# Patient Record
Sex: FEMALE | Race: OTHER | NOT HISPANIC OR LATINO
[De-identification: names, ages, dates, MRNs, and addresses within clinical notes are randomized per-mention and may not be internally consistent; named-entity substitution may affect disease eponyms.]

---

## 2017-07-26 ENCOUNTER — APPOINTMENT (OUTPATIENT)
Dept: GASTROENTEROLOGY | Facility: CLINIC | Age: 47
End: 2017-07-26

## 2017-08-01 ENCOUNTER — APPOINTMENT (OUTPATIENT)
Dept: GASTROENTEROLOGY | Facility: CLINIC | Age: 47
End: 2017-08-01

## 2017-08-03 ENCOUNTER — APPOINTMENT (OUTPATIENT)
Dept: GASTROENTEROLOGY | Facility: CLINIC | Age: 47
End: 2017-08-03
Payer: COMMERCIAL

## 2017-08-03 VITALS
TEMPERATURE: 98.2 F | DIASTOLIC BLOOD PRESSURE: 80 MMHG | BODY MASS INDEX: 24.19 KG/M2 | SYSTOLIC BLOOD PRESSURE: 120 MMHG | WEIGHT: 120 LBS | HEIGHT: 59 IN | OXYGEN SATURATION: 99 % | HEART RATE: 87 BPM

## 2017-08-03 DIAGNOSIS — Z86.79 PERSONAL HISTORY OF OTHER DISEASES OF THE CIRCULATORY SYSTEM: ICD-10-CM

## 2017-08-03 DIAGNOSIS — Z82.49 FAMILY HISTORY OF ISCHEMIC HEART DISEASE AND OTHER DISEASES OF THE CIRCULATORY SYSTEM: ICD-10-CM

## 2017-08-03 DIAGNOSIS — Z86.39 PERSONAL HISTORY OF OTHER ENDOCRINE, NUTRITIONAL AND METABOLIC DISEASE: ICD-10-CM

## 2017-08-03 PROCEDURE — 99213 OFFICE O/P EST LOW 20 MIN: CPT

## 2017-08-30 ENCOUNTER — APPOINTMENT (OUTPATIENT)
Dept: GASTROENTEROLOGY | Facility: AMBULATORY MEDICAL SERVICES | Age: 47
End: 2017-08-30
Payer: COMMERCIAL

## 2017-08-30 PROCEDURE — 43239 EGD BIOPSY SINGLE/MULTIPLE: CPT

## 2017-09-07 ENCOUNTER — APPOINTMENT (OUTPATIENT)
Dept: GASTROENTEROLOGY | Facility: AMBULATORY MEDICAL SERVICES | Age: 47
End: 2017-09-07

## 2017-09-11 ENCOUNTER — APPOINTMENT (OUTPATIENT)
Dept: GASTROENTEROLOGY | Facility: CLINIC | Age: 47
End: 2017-09-11
Payer: COMMERCIAL

## 2017-09-11 VITALS
WEIGHT: 120 LBS | BODY MASS INDEX: 24.19 KG/M2 | DIASTOLIC BLOOD PRESSURE: 90 MMHG | TEMPERATURE: 98.3 F | SYSTOLIC BLOOD PRESSURE: 130 MMHG | HEIGHT: 59 IN

## 2017-09-11 PROCEDURE — 99213 OFFICE O/P EST LOW 20 MIN: CPT

## 2017-09-11 RX ORDER — OMEPRAZOLE 40 MG/1
40 CAPSULE, DELAYED RELEASE ORAL
Qty: 90 | Refills: 0 | Status: ACTIVE | COMMUNITY
Start: 2017-09-11 | End: 1900-01-01

## 2017-09-18 ENCOUNTER — APPOINTMENT (OUTPATIENT)
Dept: GASTROENTEROLOGY | Facility: AMBULATORY MEDICAL SERVICES | Age: 47
End: 2017-09-18

## 2017-09-25 ENCOUNTER — APPOINTMENT (OUTPATIENT)
Dept: GASTROENTEROLOGY | Facility: CLINIC | Age: 47
End: 2017-09-25
Payer: COMMERCIAL

## 2017-09-25 VITALS
SYSTOLIC BLOOD PRESSURE: 120 MMHG | HEIGHT: 59 IN | DIASTOLIC BLOOD PRESSURE: 90 MMHG | TEMPERATURE: 98.7 F | WEIGHT: 117 LBS | BODY MASS INDEX: 23.59 KG/M2

## 2017-09-25 DIAGNOSIS — Z78.9 OTHER SPECIFIED HEALTH STATUS: ICD-10-CM

## 2017-09-25 PROCEDURE — 99213 OFFICE O/P EST LOW 20 MIN: CPT

## 2017-10-04 ENCOUNTER — APPOINTMENT (OUTPATIENT)
Dept: GASTROENTEROLOGY | Facility: CLINIC | Age: 47
End: 2017-10-04
Payer: COMMERCIAL

## 2017-10-04 VITALS
BODY MASS INDEX: 23.79 KG/M2 | HEIGHT: 59 IN | TEMPERATURE: 99.5 F | WEIGHT: 118 LBS | SYSTOLIC BLOOD PRESSURE: 120 MMHG | DIASTOLIC BLOOD PRESSURE: 70 MMHG

## 2017-10-04 LAB — UREA BREATH TEST QL: NEGATIVE

## 2017-10-04 PROCEDURE — 99214 OFFICE O/P EST MOD 30 MIN: CPT

## 2017-10-05 ENCOUNTER — FORM ENCOUNTER (OUTPATIENT)
Age: 47
End: 2017-10-05

## 2017-10-06 ENCOUNTER — OUTPATIENT (OUTPATIENT)
Dept: OUTPATIENT SERVICES | Facility: HOSPITAL | Age: 47
LOS: 1 days | End: 2017-10-06
Payer: COMMERCIAL

## 2017-10-06 ENCOUNTER — APPOINTMENT (OUTPATIENT)
Dept: MRI IMAGING | Facility: CLINIC | Age: 47
End: 2017-10-06
Payer: COMMERCIAL

## 2017-10-06 DIAGNOSIS — Z00.8 ENCOUNTER FOR OTHER GENERAL EXAMINATION: ICD-10-CM

## 2017-10-06 PROCEDURE — 74183 MRI ABD W/O CNTR FLWD CNTR: CPT | Mod: 26

## 2017-10-06 PROCEDURE — A9585: CPT

## 2017-10-06 PROCEDURE — 74183 MRI ABD W/O CNTR FLWD CNTR: CPT

## 2017-10-06 PROCEDURE — 82565 ASSAY OF CREATININE: CPT

## 2017-10-19 ENCOUNTER — APPOINTMENT (OUTPATIENT)
Dept: GASTROENTEROLOGY | Facility: AMBULATORY MEDICAL SERVICES | Age: 47
End: 2017-10-19
Payer: COMMERCIAL

## 2017-10-19 PROCEDURE — 45380 COLONOSCOPY AND BIOPSY: CPT

## 2017-11-02 ENCOUNTER — APPOINTMENT (OUTPATIENT)
Dept: GASTROENTEROLOGY | Facility: CLINIC | Age: 47
End: 2017-11-02

## 2017-12-29 ENCOUNTER — EMERGENCY (EMERGENCY)
Facility: HOSPITAL | Age: 47
LOS: 1 days | Discharge: HOME | End: 2017-12-29
Attending: EMERGENCY MEDICINE
Payer: COMMERCIAL

## 2017-12-29 VITALS
WEIGHT: 119.93 LBS | OXYGEN SATURATION: 97 % | RESPIRATION RATE: 18 BRPM | DIASTOLIC BLOOD PRESSURE: 77 MMHG | HEART RATE: 108 BPM | TEMPERATURE: 99 F | SYSTOLIC BLOOD PRESSURE: 119 MMHG

## 2017-12-29 VITALS
HEART RATE: 93 BPM | OXYGEN SATURATION: 100 % | RESPIRATION RATE: 16 BRPM | DIASTOLIC BLOOD PRESSURE: 59 MMHG | TEMPERATURE: 97 F | SYSTOLIC BLOOD PRESSURE: 111 MMHG

## 2017-12-29 PROCEDURE — 99284 EMERGENCY DEPT VISIT MOD MDM: CPT

## 2017-12-29 PROCEDURE — 96375 TX/PRO/DX INJ NEW DRUG ADDON: CPT

## 2017-12-29 PROCEDURE — 99284 EMERGENCY DEPT VISIT MOD MDM: CPT | Mod: 25

## 2017-12-29 PROCEDURE — 96374 THER/PROPH/DIAG INJ IV PUSH: CPT

## 2017-12-29 RX ORDER — SODIUM CHLORIDE 9 MG/ML
1000 INJECTION INTRAMUSCULAR; INTRAVENOUS; SUBCUTANEOUS ONCE
Qty: 0 | Refills: 0 | Status: COMPLETED | OUTPATIENT
Start: 2017-12-29 | End: 2017-12-29

## 2017-12-29 RX ORDER — DIPHENHYDRAMINE HCL 50 MG
25 CAPSULE ORAL ONCE
Qty: 0 | Refills: 0 | Status: COMPLETED | OUTPATIENT
Start: 2017-12-29 | End: 2017-12-29

## 2017-12-29 RX ORDER — ACETAMINOPHEN 500 MG
1000 TABLET ORAL ONCE
Qty: 0 | Refills: 0 | Status: COMPLETED | OUTPATIENT
Start: 2017-12-29 | End: 2017-12-29

## 2017-12-29 RX ORDER — KETOROLAC TROMETHAMINE 30 MG/ML
30 SYRINGE (ML) INJECTION ONCE
Qty: 0 | Refills: 0 | Status: DISCONTINUED | OUTPATIENT
Start: 2017-12-29 | End: 2017-12-29

## 2017-12-29 RX ORDER — METOCLOPRAMIDE HCL 10 MG
10 TABLET ORAL ONCE
Qty: 0 | Refills: 0 | Status: COMPLETED | OUTPATIENT
Start: 2017-12-29 | End: 2017-12-29

## 2017-12-29 RX ADMIN — SODIUM CHLORIDE 1000 MILLILITER(S): 9 INJECTION INTRAMUSCULAR; INTRAVENOUS; SUBCUTANEOUS at 10:27

## 2017-12-29 RX ADMIN — Medication 1000 MILLIGRAM(S): at 11:04

## 2017-12-29 RX ADMIN — Medication 10 MILLIGRAM(S): at 10:20

## 2017-12-29 RX ADMIN — Medication 25 MILLIGRAM(S): at 10:26

## 2017-12-29 RX ADMIN — Medication 30 MILLIGRAM(S): at 11:04

## 2017-12-29 RX ADMIN — Medication 30 MILLIGRAM(S): at 10:24

## 2017-12-29 RX ADMIN — Medication 400 MILLIGRAM(S): at 10:28

## 2017-12-29 NOTE — ED ADULT NURSE NOTE - OBJECTIVE STATEMENT
47 year old female presents to ED c/o HA x 1week with neck tension denies fevers or chills with nausea no vomiting. Neuro intact. no nuchal rigidity.

## 2017-12-29 NOTE — ED PROVIDER NOTE - CARE PLAN
Principal Discharge DX:	Migraine  Instructions for follow-up, activity and diet:	stay hydrated  Follow up with your Primary Care Physician within the next 2-3 days  Bring a copy of your test results with you to your appointment  Continue your current medication regimen  Return to the Emergency Room if you experience new or worsening symptoms  Take Motrin 400-600mg every 6 hrs as needed for pain. Take with food   Take Tylenol 650mg every 6 hrs as needed for pain.  Take benadryl 25 mg 3x per day as needed

## 2017-12-29 NOTE — ED PROVIDER NOTE - PLAN OF CARE
stay hydrated  Follow up with your Primary Care Physician within the next 2-3 days  Bring a copy of your test results with you to your appointment  Continue your current medication regimen  Return to the Emergency Room if you experience new or worsening symptoms  Take Motrin 400-600mg every 6 hrs as needed for pain. Take with food   Take Tylenol 650mg every 6 hrs as needed for pain.  Take benadryl 25 mg 3x per day as needed

## 2017-12-29 NOTE — ED PROVIDER NOTE - MEDICAL DECISION MAKING DETAILS
ROSCOE Michelle MD: 46 y/o female with PMH of HTN, HLD, DM and migraines who presents to the ED for HA. Pt states that HA began 6 days ago, started in back of neck, radiated up back of head to L temple, then to R side of head. Pt reports she has been taking mucinex thinking it was sinus related and excedrin migraine. Excedrin helped somewhat but did not resolve HA. HA came on gradually over several days. No thunderclap. + photophobia and nausea without vomiting. Denies Focal neurologic deficits, numbness/weakness, visual disturbance, neck stiffness. Neurologic exam normal, no meningismus on exam.  DDx: migraine HA, tension HA, other HA syndrome. No red flags for ICH/meningitis.  Plan: migraine cocktail, re-assess

## 2017-12-29 NOTE — ED ADULT NURSE NOTE - PSH
Deviated septum    History of Tubal Ligation  2005  Rotator cuff injury  left side rerpaired twice  S/P LEEP (status post loop electrosurgical excision procedure)

## 2017-12-29 NOTE — ED ADULT NURSE NOTE - PMH
Constipation - functional  since childhood  Diabetes mellitus    H/O hypercholesterolemia    Migraine    Seasonal allergies

## 2017-12-29 NOTE — ED PROVIDER NOTE - OBJECTIVE STATEMENT
48 y/o female with hx of HTN, HLD, DM and migraines who presents to the ED for migraine. reports she has been taking mucinex thinking it was sinus related and excedrin migraine. excedrin helped but did not resolve HA. patient reports it is a generalized headache and "moves" for 5 days. sometimes occipital, sometimes temporal. no fever/chill. + photophobia and nausea without vomiting.

## 2019-08-15 NOTE — ED PROVIDER NOTE - ATTENDING CONTRIBUTION TO CARE
Point of Care Ultrasound Cardiac Point of Care Ultrasound AAA I saw and evaluated patient with ACP. I discussed H+P and MDM with ACP. I agree with the statements made by the ACP unless otherwise noted.

## 2019-09-04 ENCOUNTER — APPOINTMENT (OUTPATIENT)
Dept: GASTROENTEROLOGY | Facility: CLINIC | Age: 49
End: 2019-09-04
Payer: COMMERCIAL

## 2019-09-04 VITALS
WEIGHT: 108 LBS | SYSTOLIC BLOOD PRESSURE: 110 MMHG | HEIGHT: 59 IN | DIASTOLIC BLOOD PRESSURE: 80 MMHG | OXYGEN SATURATION: 98 % | HEART RATE: 81 BPM | BODY MASS INDEX: 21.77 KG/M2 | TEMPERATURE: 98.3 F

## 2019-09-04 DIAGNOSIS — Z87.39 PERSONAL HISTORY OF OTHER DISEASES OF THE MUSCULOSKELETAL SYSTEM AND CONNECTIVE TISSUE: ICD-10-CM

## 2019-09-04 PROCEDURE — 99214 OFFICE O/P EST MOD 30 MIN: CPT

## 2019-09-04 NOTE — ASSESSMENT
[FreeTextEntry1] : Impression and plan\par \par Patient presents the office today with complaints of gassy bloating epigastric pain chronic constipation. Previous upper endoscopy showed H. pylori infection with gastritis colonoscopy was unrevealing. Radiologic testing showed abnormality in the liver and pancreas. I will repeat MRI scan and recommend upper endoscopy. The patient was counseled regarding risks and benefits. Further suggestion after above testing. Over-the-counter laxative treatment was discussed. We will pursue further as we go.

## 2019-09-04 NOTE — HISTORY OF PRESENT ILLNESS
[FreeTextEntry1] : Patient came to the office today for complaints of gassy bloating. Patient has had similar complaints in the past please see previous note. Patient had investigation including abdominal sonogram CT scan MRI and upper and lower endoscopy. Previous testing revealed H. pylori infection which was treated and followed with H. pylori breath test. Patient was noted on radiologic testing to have fatty infiltration of the liver with questionable hepatomegaly fatty sparing pancreatic cyst.\par \par Patient complains about chronic constipation with up to 5 days between bowel movements. She takes MiraLax with only partial relief. She denies nausea vomiting fever chills rectal bleeding or melena. She has excess flatus and gassy bloating. She is sometimes uncomfortable until she moves her bowels.

## 2019-09-04 NOTE — PHYSICAL EXAM
[General Appearance - Alert] : alert [General Appearance - In No Acute Distress] : in no acute distress [PERRL With Normal Accommodation] : pupils were equal in size, round, and reactive to light [Sclera] : the sclera and conjunctiva were normal [Extraocular Movements] : extraocular movements were intact [Outer Ear] : the ears and nose were normal in appearance [Oropharynx] : the oropharynx was normal [Neck Appearance] : the appearance of the neck was normal [Neck Cervical Mass (___cm)] : no neck mass was observed [Jugular Venous Distention Increased] : there was no jugular-venous distention [Thyroid Nodule] : there were no palpable thyroid nodules [Thyroid Diffuse Enlargement] : the thyroid was not enlarged [Heart Rate And Rhythm] : heart rate was normal and rhythm regular [Auscultation Breath Sounds / Voice Sounds] : lungs were clear to auscultation bilaterally [Heart Sounds] : normal S1 and S2 [Heart Sounds Gallop] : no gallops [Heart Sounds Pericardial Friction Rub] : no pericardial rub [Murmurs] : no murmurs [Edema] : there was no peripheral edema [Abdomen Soft] : soft [Bowel Sounds] : normal bowel sounds [Abdomen Tenderness] : non-tender [Abdomen Mass (___ Cm)] : no abdominal mass palpated [No Spinal Tenderness] : no spinal tenderness [No CVA Tenderness] : no ~M costovertebral angle tenderness [Abnormal Walk] : normal gait [Musculoskeletal - Swelling] : no joint swelling seen [Nail Clubbing] : no clubbing  or cyanosis of the fingernails [Motor Tone] : muscle strength and tone were normal [Skin Turgor] : normal skin turgor [Skin Color & Pigmentation] : normal skin color and pigmentation [] : no rash [Sensation] : the sensory exam was normal to light touch and pinprick [Deep Tendon Reflexes (DTR)] : deep tendon reflexes were 2+ and symmetric [Oriented To Time, Place, And Person] : oriented to person, place, and time [No Focal Deficits] : no focal deficits [Impaired Insight] : insight and judgment were intact [Affect] : the affect was normal

## 2019-09-05 ENCOUNTER — APPOINTMENT (OUTPATIENT)
Dept: GASTROENTEROLOGY | Facility: AMBULATORY MEDICAL SERVICES | Age: 49
End: 2019-09-05
Payer: COMMERCIAL

## 2019-09-05 PROCEDURE — 43239 EGD BIOPSY SINGLE/MULTIPLE: CPT

## 2019-09-20 ENCOUNTER — FORM ENCOUNTER (OUTPATIENT)
Age: 49
End: 2019-09-20

## 2019-09-21 ENCOUNTER — APPOINTMENT (OUTPATIENT)
Dept: MRI IMAGING | Facility: CLINIC | Age: 49
End: 2019-09-21
Payer: COMMERCIAL

## 2019-09-21 ENCOUNTER — OUTPATIENT (OUTPATIENT)
Dept: OUTPATIENT SERVICES | Facility: HOSPITAL | Age: 49
LOS: 1 days | End: 2019-09-21
Payer: COMMERCIAL

## 2019-09-21 DIAGNOSIS — Z00.8 ENCOUNTER FOR OTHER GENERAL EXAMINATION: ICD-10-CM

## 2019-09-21 PROCEDURE — 74183 MRI ABD W/O CNTR FLWD CNTR: CPT | Mod: 26

## 2019-09-21 PROCEDURE — 74183 MRI ABD W/O CNTR FLWD CNTR: CPT

## 2020-10-06 ENCOUNTER — APPOINTMENT (OUTPATIENT)
Dept: GASTROENTEROLOGY | Facility: CLINIC | Age: 50
End: 2020-10-06
Payer: COMMERCIAL

## 2020-10-06 VITALS
DIASTOLIC BLOOD PRESSURE: 80 MMHG | HEART RATE: 98 BPM | OXYGEN SATURATION: 98 % | WEIGHT: 102 LBS | SYSTOLIC BLOOD PRESSURE: 110 MMHG | BODY MASS INDEX: 20.56 KG/M2 | TEMPERATURE: 97.9 F | HEIGHT: 59 IN

## 2020-10-06 PROCEDURE — 99213 OFFICE O/P EST LOW 20 MIN: CPT

## 2020-10-06 NOTE — ASSESSMENT
[FreeTextEntry1] : Impression and plan patient came to the office today with breakthrough dyspeptic complaints. I asked her to double up on a medication for short term to see if this will help. I will decide what if any further investigation might be needed. Patient is up-to-date on endoscopic testing but may require repeat endoscopy. Patient will keep in touch by phone and update me in about 2 weeks

## 2020-10-26 ENCOUNTER — NON-APPOINTMENT (OUTPATIENT)
Age: 50
End: 2020-10-26

## 2020-10-26 ENCOUNTER — APPOINTMENT (OUTPATIENT)
Dept: CARDIOLOGY | Facility: CLINIC | Age: 50
End: 2020-10-26
Payer: COMMERCIAL

## 2020-10-26 ENCOUNTER — TRANSCRIPTION ENCOUNTER (OUTPATIENT)
Age: 50
End: 2020-10-26

## 2020-10-26 VITALS
BODY MASS INDEX: 20.56 KG/M2 | TEMPERATURE: 98.4 F | RESPIRATION RATE: 16 BRPM | DIASTOLIC BLOOD PRESSURE: 66 MMHG | WEIGHT: 102 LBS | HEART RATE: 80 BPM | HEIGHT: 59 IN | SYSTOLIC BLOOD PRESSURE: 104 MMHG | OXYGEN SATURATION: 100 %

## 2020-10-26 DIAGNOSIS — R94.31 ABNORMAL ELECTROCARDIOGRAM [ECG] [EKG]: ICD-10-CM

## 2020-10-26 DIAGNOSIS — L40.50 ARTHROPATHIC PSORIASIS, UNSPECIFIED: ICD-10-CM

## 2020-10-26 DIAGNOSIS — R06.00 DYSPNEA, UNSPECIFIED: ICD-10-CM

## 2020-10-26 PROCEDURE — 93306 TTE W/DOPPLER COMPLETE: CPT

## 2020-10-26 PROCEDURE — 99203 OFFICE O/P NEW LOW 30 MIN: CPT

## 2020-10-26 PROCEDURE — 93000 ELECTROCARDIOGRAM COMPLETE: CPT

## 2020-10-26 RX ORDER — METOPROLOL TARTRATE 25 MG/1
25 TABLET, FILM COATED ORAL TWICE DAILY
Qty: 60 | Refills: 2 | Status: DISCONTINUED | COMMUNITY
End: 2020-10-26

## 2020-10-26 NOTE — PHYSICAL EXAM
[Normal Appearance] : normal appearance [General Appearance - In No Acute Distress] : no acute distress [Eyelids - No Xanthelasma] : the eyelids demonstrated no xanthelasmas [No Oral Pallor] : no oral pallor [No Oral Cyanosis] : no oral cyanosis [Normal Jugular Venous A Waves Present] : normal jugular venous A waves present [Normal Jugular Venous V Waves Present] : normal jugular venous V waves present [No Jugular Venous Balderrama A Waves] : no jugular venous balderrama A waves [Heart Sounds] : normal S1 and S2 [Murmurs] : no murmurs present [Arterial Pulses Normal] : the arterial pulses were normal [Edema] : no peripheral edema present [] : no respiratory distress [Respiration, Rhythm And Depth] : normal respiratory rhythm and effort [Exaggerated Use Of Accessory Muscles For Inspiration] : no accessory muscle use [Auscultation Breath Sounds / Voice Sounds] : lungs were clear to auscultation bilaterally [Abdomen Soft] : soft [Abdomen Tenderness] : non-tender [Gait - Sufficient For Exercise Testing] : the gait was sufficient for exercise testing [Cyanosis, Localized] : no localized cyanosis [Skin Color & Pigmentation] : normal skin color and pigmentation [Skin Turgor] : normal skin turgor [FreeTextEntry1] : regular rate and rhythm with frequency ectopic beats

## 2020-10-26 NOTE — REVIEW OF SYSTEMS
[Recent Weight Loss (___ Lbs)] : recent [unfilled] ~Ulb weight loss [Feeling Fatigued] : feeling fatigued [Eyeglasses] : currently wearing eyeglasses [Shortness Of Breath] : shortness of breath [Dyspnea on exertion] : dyspnea during exertion [Palpitations] : palpitations [Under Stress] : under stress [Negative] : Heme/Lymph [Fever] : no fever [Chills] : no chills [Blurry Vision] : no blurred vision [Seeing Double (Diplopia)] : no diplopia [Eye Pain] : no eye pain [Chest  Pressure] : no chest pressure [Chest Pain] : no chest pain [Lower Ext Edema] : no extremity edema [Leg Claudication] : no intermittent leg claudication [Suicidal] : not suicidal

## 2020-10-26 NOTE — DISCUSSION/SUMMARY
[FreeTextEntry1] : palpitations with SOB at rest and OLIVAS\par ECG with interpolated PVC's, relatively narrow QRS\par recent stress test without evidence of ischemia. suspect the cMRI was to evaluate for scar as the etiology of her ventricular ectopy.\par TTE today with nl LVEF\par \par -obtain outside records\par -check holter monitor to evaluate her ectopy burden\par -given her symptoms we discussed the role of initiating metoprolol tartrate 25mg BID. she reports not being previous on medications for her palpitations. we reviewed the potential side effects. we will increase this as tolerated. it is possible that her acute stress is leading to an increase in her symptoms. If her symptoms persist despite maximally tolerated medical therapy we will consider referral to EP for PVC ablation.

## 2020-10-26 NOTE — HISTORY OF PRESENT ILLNESS
[FreeTextEntry1] : PMD: Dr. Andres Arguello\par \par 49F HTN, HLD, DM2, psoriatic arthritis, hx of migraines, cervical spondylosis who presents for second opinion re: palpitations.\par \par Pt states approx two months ago she underwent an evaluation for palpitations, including a TTE, stress test, ?cMRI and was told there were no significant issues.\par \par Over the past three weeks she notes an increase in the frequency of palpitations and notes a new sensation in her neck. She also notes unilateral/occ bilateral tension-type headache worse with irregular heartbeat. Denies any acute visual or gait disturbances. No fever of facial pain. No dizziness, no LOC. Currently has minimal caffeine intake.\par \par There is also new SOB at rest, OLIVAS with one flight of stairs. poor appetite. weight loss.\par more stress in life, including mother in [facility] with cancer.\par \par has 2 children\par works as \par \par ECG 10/26/2020:\par SR with interpolated PVC's (pSVT was entered in error)

## 2020-10-26 NOTE — ADDENDUM
[FreeTextEntry1] : The services during this visit were rendered during a time of a public health emergency (PHE). Additional staffing time was spent screening for signs/symptoms of COVID-19 prior to evaluation by the provider. The provider donned a N95, a surgical mask, and reusable face shield as needed. Durable medical equipment, reusable PPE, and all shared or commonly touched surfaces were disinfected as per CDC recommendations and exam rooms were left vacant for a period of time to decrease risk of airborne transmission.

## 2020-10-28 ENCOUNTER — NON-APPOINTMENT (OUTPATIENT)
Age: 50
End: 2020-10-28

## 2020-10-29 ENCOUNTER — APPOINTMENT (OUTPATIENT)
Dept: GASTROENTEROLOGY | Facility: CLINIC | Age: 50
End: 2020-10-29

## 2020-10-30 ENCOUNTER — NON-APPOINTMENT (OUTPATIENT)
Age: 50
End: 2020-10-30

## 2020-11-16 ENCOUNTER — APPOINTMENT (OUTPATIENT)
Dept: ELECTROPHYSIOLOGY | Facility: CLINIC | Age: 50
End: 2020-11-16
Payer: COMMERCIAL

## 2020-11-16 ENCOUNTER — NON-APPOINTMENT (OUTPATIENT)
Age: 50
End: 2020-11-16

## 2020-11-16 VITALS — BODY MASS INDEX: 20.76 KG/M2 | WEIGHT: 103 LBS | HEIGHT: 59 IN

## 2020-11-16 VITALS
HEIGHT: 59 IN | BODY MASS INDEX: 20.8 KG/M2 | OXYGEN SATURATION: 99 % | DIASTOLIC BLOOD PRESSURE: 84 MMHG | HEART RATE: 78 BPM | SYSTOLIC BLOOD PRESSURE: 134 MMHG | RESPIRATION RATE: 98 BRPM | TEMPERATURE: 97.8 F

## 2020-11-16 PROCEDURE — 99205 OFFICE O/P NEW HI 60 MIN: CPT

## 2020-11-16 PROCEDURE — 99072 ADDL SUPL MATRL&STAF TM PHE: CPT

## 2020-11-16 PROCEDURE — 93000 ELECTROCARDIOGRAM COMPLETE: CPT

## 2020-11-16 RX ORDER — POLYETHYLENE GLYCOL 3350, SODIUM SULFATE, SODIUM CHLORIDE, POTASSIUM CHLORIDE, ASCORBIC ACID, SODIUM ASCORBATE 7.5-2.691G
100 KIT ORAL
Qty: 1 | Refills: 0 | Status: DISCONTINUED | COMMUNITY
Start: 2017-10-09 | End: 2020-11-16

## 2020-11-16 RX ORDER — METOPROLOL TARTRATE 50 MG/1
50 TABLET, FILM COATED ORAL
Qty: 180 | Refills: 1 | Status: DISCONTINUED | COMMUNITY
Start: 2020-10-26 | End: 2020-11-16

## 2020-11-16 RX ORDER — BISMUTH SUBCITRATE POTASSIUM, METRONIDAZOLE, AND TETRACYCLINE HYDROCHLORIDE 140; 125; 125 MG/1; MG/1; MG/1
140-125-125 CAPSULE ORAL 3 TIMES DAILY
Qty: 120 | Refills: 0 | Status: DISCONTINUED | COMMUNITY
Start: 2017-09-11 | End: 2020-11-16

## 2020-11-16 RX ORDER — SODIUM SULFATE, POTASSIUM SULFATE, MAGNESIUM SULFATE 17.5; 3.13; 1.6 G/ML; G/ML; G/ML
17.5-3.13-1.6 SOLUTION, CONCENTRATE ORAL
Qty: 2 | Refills: 0 | Status: DISCONTINUED | COMMUNITY
Start: 2017-10-04 | End: 2020-11-16

## 2020-11-16 RX ORDER — NALTREXONE HCL 100 %
POWDER (GRAM) MISCELLANEOUS
Refills: 0 | Status: DISCONTINUED | COMMUNITY
Start: 2020-10-26 | End: 2020-11-16

## 2020-11-16 RX ORDER — OMEPRAZOLE 40 MG/1
40 CAPSULE, DELAYED RELEASE ORAL
Qty: 90 | Refills: 2 | Status: DISCONTINUED | COMMUNITY
Start: 2017-08-03 | End: 2020-11-16

## 2020-11-16 NOTE — DISCUSSION/SUMMARY
[FreeTextEntry1] : Anh Rodriguez is a 50y/o woman with Hx of HTN, HLD, both of which are stable, DMII, psoriatic arthritis, Hx of migraines, cervical spondylosis and persistent palpitations with noted frequent PVCs on EKG who presents today for initial evaluation. \par \par Impression:\par \par 1. PVCs: EKG today NSR with frequent PVCs, likely fascicular. Given symptomatic despite use of beta blockers, consider alternative treatment options such as antiarrhythmics, mexiletine, vs possible PVC ablation. Prefers to attempt ablation and avoid further medication use. Risks, benefits, and alternatives to procedure discussed at length. Risks including that of bleeding, infection, stroke, and cardiac tamponade discussed and she verbalizes understanding of all.\par \par 2. HTN: resume oral antihypertensives as prescribed. Encouraged heart healthy diet, sodium restriction, and weight loss. Continue regular f/u with Cardiologist for further HTN management.\par \par 3. GERD: resume PPI therapy as prescribed.

## 2020-11-16 NOTE — HISTORY OF PRESENT ILLNESS
[FreeTextEntry1] : Cyndy Terrell MD\par Dr. Phylicia Currie\par \par Anh Rodriguez is a 50y/o woman with Hx of HTN, HLD, both of which are stable, DMII, psoriatic arthritis, Hx of migraines, cervical spondylosis and persistent palpitations with noted frequent PVCs on EKG who presents today for initial evaluation. Began feeling palpitations about 6 months ago. Saw a Cardiologist at that time and was told she has extra betas but it was normal, had cardiac MRI which was normal. Symptoms began to go away and then came back. Feels palpitations all day long which sometimes radiates to her neck, often felt during sleep. Lying on her left side can exacerbate symptoms. Was started on metoprolol, titrating up but with no change in symptoms. Does have some dyspnea on exertion when she feels palpitations. Denies chest pain, SOB, syncope or near syncope.\par

## 2020-11-16 NOTE — PHYSICAL EXAM
[General Appearance - Well Developed] : well developed [Normal Appearance] : normal appearance [Well Groomed] : well groomed [General Appearance - Well Nourished] : well nourished [No Deformities] : no deformities [General Appearance - In No Acute Distress] : no acute distress [Normal Conjunctiva] : the conjunctiva exhibited no abnormalities [Eyelids - No Xanthelasma] : the eyelids demonstrated no xanthelasmas [Normal Oral Mucosa] : normal oral mucosa [No Oral Pallor] : no oral pallor [No Oral Cyanosis] : no oral cyanosis [Normal Jugular Venous A Waves Present] : normal jugular venous A waves present [Normal Jugular Venous V Waves Present] : normal jugular venous V waves present [No Jugular Venous Balderrama A Waves] : no jugular venous balderrama A waves [Heart Rate And Rhythm] : heart rate and rhythm were normal [Heart Sounds] : normal S1 and S2 [Murmurs] : no murmurs present [Respiration, Rhythm And Depth] : normal respiratory rhythm and effort [Exaggerated Use Of Accessory Muscles For Inspiration] : no accessory muscle use [Auscultation Breath Sounds / Voice Sounds] : lungs were clear to auscultation bilaterally [Abdomen Soft] : soft [Abdomen Tenderness] : non-tender [Abdomen Mass (___ Cm)] : no abdominal mass palpated [Abnormal Walk] : normal gait [Gait - Sufficient For Exercise Testing] : the gait was sufficient for exercise testing [Nail Clubbing] : no clubbing of the fingernails [Cyanosis, Localized] : no localized cyanosis [Petechial Hemorrhages (___cm)] : no petechial hemorrhages [Skin Color & Pigmentation] : normal skin color and pigmentation [] : no rash [No Venous Stasis] : no venous stasis [Skin Lesions] : no skin lesions [No Skin Ulcers] : no skin ulcer [No Xanthoma] : no  xanthoma was observed [Oriented To Time, Place, And Person] : oriented to person, place, and time [Affect] : the affect was normal [Mood] : the mood was normal [No Anxiety] : not feeling anxious

## 2020-12-11 ENCOUNTER — OUTPATIENT (OUTPATIENT)
Dept: OUTPATIENT SERVICES | Facility: HOSPITAL | Age: 50
LOS: 1 days | End: 2020-12-11

## 2020-12-11 VITALS
WEIGHT: 104.94 LBS | RESPIRATION RATE: 18 BRPM | HEIGHT: 59 IN | DIASTOLIC BLOOD PRESSURE: 72 MMHG | TEMPERATURE: 98 F | HEART RATE: 83 BPM | SYSTOLIC BLOOD PRESSURE: 118 MMHG | OXYGEN SATURATION: 99 %

## 2020-12-11 DIAGNOSIS — I49.3 VENTRICULAR PREMATURE DEPOLARIZATION: ICD-10-CM

## 2020-12-11 DIAGNOSIS — Z01.812 ENCOUNTER FOR PREPROCEDURAL LABORATORY EXAMINATION: ICD-10-CM

## 2020-12-11 DIAGNOSIS — E11.9 TYPE 2 DIABETES MELLITUS WITHOUT COMPLICATIONS: ICD-10-CM

## 2020-12-11 DIAGNOSIS — Z90.721 ACQUIRED ABSENCE OF OVARIES, UNILATERAL: Chronic | ICD-10-CM

## 2020-12-11 DIAGNOSIS — Z98.890 OTHER SPECIFIED POSTPROCEDURAL STATES: Chronic | ICD-10-CM

## 2020-12-11 DIAGNOSIS — Z91.040 LATEX ALLERGY STATUS: ICD-10-CM

## 2020-12-11 DIAGNOSIS — I10 ESSENTIAL (PRIMARY) HYPERTENSION: ICD-10-CM

## 2020-12-11 LAB
A1C WITH ESTIMATED AVERAGE GLUCOSE RESULT: 6.6 % — HIGH (ref 4–5.6)
ALBUMIN SERPL ELPH-MCNC: 4.9 G/DL — SIGNIFICANT CHANGE UP (ref 3.3–5)
ALP SERPL-CCNC: 89 U/L — SIGNIFICANT CHANGE UP (ref 40–120)
ALT FLD-CCNC: 29 U/L — SIGNIFICANT CHANGE UP (ref 4–33)
ANION GAP SERPL CALC-SCNC: 14 MMOL/L — SIGNIFICANT CHANGE UP (ref 7–14)
AST SERPL-CCNC: 23 U/L — SIGNIFICANT CHANGE UP (ref 4–32)
BILIRUB SERPL-MCNC: <0.2 MG/DL — SIGNIFICANT CHANGE UP (ref 0.2–1.2)
BLD GP AB SCN SERPL QL: NEGATIVE — SIGNIFICANT CHANGE UP
BUN SERPL-MCNC: 19 MG/DL — SIGNIFICANT CHANGE UP (ref 7–23)
CALCIUM SERPL-MCNC: 9.9 MG/DL — SIGNIFICANT CHANGE UP (ref 8.4–10.5)
CHLORIDE SERPL-SCNC: 101 MMOL/L — SIGNIFICANT CHANGE UP (ref 98–107)
CO2 SERPL-SCNC: 28 MMOL/L — SIGNIFICANT CHANGE UP (ref 22–31)
CREAT SERPL-MCNC: 0.58 MG/DL — SIGNIFICANT CHANGE UP (ref 0.5–1.3)
ESTIMATED AVERAGE GLUCOSE: 143 MG/DL — HIGH (ref 68–114)
GLUCOSE SERPL-MCNC: 115 MG/DL — HIGH (ref 70–99)
HCT VFR BLD CALC: 35.5 % — SIGNIFICANT CHANGE UP (ref 34.5–45)
HGB BLD-MCNC: 11.8 G/DL — SIGNIFICANT CHANGE UP (ref 11.5–15.5)
MCHC RBC-ENTMCNC: 30.3 PG — SIGNIFICANT CHANGE UP (ref 27–34)
MCHC RBC-ENTMCNC: 33.2 GM/DL — SIGNIFICANT CHANGE UP (ref 32–36)
MCV RBC AUTO: 91.3 FL — SIGNIFICANT CHANGE UP (ref 80–100)
NRBC # BLD: 0 /100 WBCS — SIGNIFICANT CHANGE UP
NRBC # FLD: 0 K/UL — SIGNIFICANT CHANGE UP
PLATELET # BLD AUTO: 281 K/UL — SIGNIFICANT CHANGE UP (ref 150–400)
POTASSIUM SERPL-MCNC: 3.6 MMOL/L — SIGNIFICANT CHANGE UP (ref 3.5–5.3)
POTASSIUM SERPL-SCNC: 3.6 MMOL/L — SIGNIFICANT CHANGE UP (ref 3.5–5.3)
PROT SERPL-MCNC: 7.3 G/DL — SIGNIFICANT CHANGE UP (ref 6–8.3)
RBC # BLD: 3.89 M/UL — SIGNIFICANT CHANGE UP (ref 3.8–5.2)
RBC # FLD: 12.5 % — SIGNIFICANT CHANGE UP (ref 10.3–14.5)
RH IG SCN BLD-IMP: POSITIVE — SIGNIFICANT CHANGE UP
SODIUM SERPL-SCNC: 143 MMOL/L — SIGNIFICANT CHANGE UP (ref 135–145)
WBC # BLD: 7.66 K/UL — SIGNIFICANT CHANGE UP (ref 3.8–10.5)
WBC # FLD AUTO: 7.66 K/UL — SIGNIFICANT CHANGE UP (ref 3.8–10.5)

## 2020-12-11 RX ORDER — RAMIPRIL 5 MG
0 CAPSULE ORAL
Qty: 0 | Refills: 0 | DISCHARGE

## 2020-12-11 RX ORDER — SULFASALAZINE 500 MG
0 TABLET ORAL
Qty: 0 | Refills: 0 | DISCHARGE

## 2020-12-11 RX ORDER — APREMILAST 10-20-30MG
0 KIT ORAL
Qty: 0 | Refills: 0 | DISCHARGE

## 2020-12-11 RX ORDER — ATORVASTATIN CALCIUM 80 MG/1
0 TABLET, FILM COATED ORAL
Qty: 0 | Refills: 0 | DISCHARGE

## 2020-12-11 NOTE — H&P PST ADULT - NSICDXPROBLEM_GEN_ALL_CORE_FT
PROBLEM DIAGNOSES  Problem: Latex allergy  Assessment and Plan: OR booking notified via fax     Problem: Ventricular premature depolarization  Assessment and Plan: Scheduled for complex ablation 12/18/2020  Written & verbal preop instructions, gi prophylaxis - pt to take own & surgical soap given  Pt verbalized good understanding.  Teach back done on surgical soap instructions.      Problem: Hypertension  Assessment and Plan: Pt instructed to take ramipril on morning of surgery.      Problem: Encounter for preprocedure screening laboratory testing for COVID-19  Assessment and Plan: Per pt to have test done within 72 hrs of this procedure       PROBLEM DIAGNOSES  Problem: Type 2 diabetes mellitus  Assessment and Plan: FS on admit     Problem: Latex allergy  Assessment and Plan: OR booking notified via fax     Problem: Ventricular premature depolarization  Assessment and Plan: Scheduled for complex ablation 12/18/2020  Written & verbal preop instructions given on surgical soap.  Pt verbalized good understanding.  Teach back done on surgical soap instructions.  all other preop instructions, pt instructed to followed instructions from Dr Fay office and to address any clarification on medications to be take prior to surgery with Dr Fay office   Ekg on admit      Problem: Encounter for preprocedure screening laboratory testing for COVID-19  Assessment and Plan: Per pt to have test done within 72 hrs of this procedure

## 2020-12-11 NOTE — H&P PST ADULT - NEGATIVE GENERAL GENITOURINARY SYMPTOMS
no flank pain R/no urine discoloration/no flank pain L/no bladder infections/no gas in urine/no renal colic

## 2020-12-11 NOTE — H&P PST ADULT - NSICDXPASTMEDICALHX_GEN_ALL_CORE_FT
PAST MEDICAL HISTORY:  Acid reflux     Constipation - functional     Diabetes mellitus type II    H/O hypercholesterolemia     Hypertension     Migraine     Osteoarthritis     Psoriasis     Seasonal allergies

## 2020-12-11 NOTE — H&P PST ADULT - NSICDXPASTSURGICALHX_GEN_ALL_CORE_FT
PAST SURGICAL HISTORY:  Deviated septum     History of arthroplasty of right hip 2015    History of Tubal Ligation 2005    Rotator cuff injury left side rerpaired twice    S/P LEEP (status post loop electrosurgical excision procedure)     S/P left oophorectomy several yrs ago

## 2020-12-11 NOTE — H&P PST ADULT - NEGATIVE ENMT SYMPTOMS
no throat pain/no ear pain/no tinnitus/no vertigo/no sinus symptoms/no nasal congestion/no dysphagia/no hearing difficulty

## 2020-12-11 NOTE — H&P PST ADULT - HISTORY OF PRESENT ILLNESS
48y/o female presents for preop eval for scheduled complex  ablation.  Pt with h/o palpitations since for passed several months.

## 2020-12-11 NOTE — H&P PST ADULT - NSICDXFAMILYHX_GEN_ALL_CORE_FT
FAMILY HISTORY:  Family hx of hypertension, mother  FH: type 2 diabetes, mother  FHx: heart disease, brother  FHx: throat cancer, mother

## 2020-12-13 DIAGNOSIS — Z01.818 ENCOUNTER FOR OTHER PREPROCEDURAL EXAMINATION: ICD-10-CM

## 2020-12-15 ENCOUNTER — APPOINTMENT (OUTPATIENT)
Dept: DISASTER EMERGENCY | Facility: CLINIC | Age: 50
End: 2020-12-15

## 2020-12-16 LAB — SARS-COV-2 N GENE NPH QL NAA+PROBE: NOT DETECTED

## 2020-12-18 ENCOUNTER — OUTPATIENT (OUTPATIENT)
Dept: OUTPATIENT SERVICES | Facility: HOSPITAL | Age: 50
LOS: 1 days | Discharge: HOME | End: 2020-12-18
Payer: COMMERCIAL

## 2020-12-18 DIAGNOSIS — I49.3 VENTRICULAR PREMATURE DEPOLARIZATION: ICD-10-CM

## 2020-12-18 DIAGNOSIS — Z98.890 OTHER SPECIFIED POSTPROCEDURAL STATES: Chronic | ICD-10-CM

## 2020-12-18 DIAGNOSIS — Z90.721 ACQUIRED ABSENCE OF OVARIES, UNILATERAL: Chronic | ICD-10-CM

## 2020-12-18 PROBLEM — L40.9 PSORIASIS, UNSPECIFIED: Chronic | Status: ACTIVE | Noted: 2020-12-11

## 2020-12-18 PROBLEM — M19.90 UNSPECIFIED OSTEOARTHRITIS, UNSPECIFIED SITE: Chronic | Status: ACTIVE | Noted: 2020-12-11

## 2020-12-18 PROBLEM — I10 ESSENTIAL (PRIMARY) HYPERTENSION: Chronic | Status: ACTIVE | Noted: 2020-12-11

## 2020-12-18 PROBLEM — K21.9 GASTRO-ESOPHAGEAL REFLUX DISEASE WITHOUT ESOPHAGITIS: Chronic | Status: ACTIVE | Noted: 2020-12-11

## 2020-12-18 LAB — GLUCOSE BLDC GLUCOMTR-MCNC: 144 MG/DL — HIGH (ref 70–99)

## 2020-12-18 PROCEDURE — 93010 ELECTROCARDIOGRAM REPORT: CPT

## 2020-12-18 RX ORDER — FAMOTIDINE 10 MG/ML
1 INJECTION INTRAVENOUS
Qty: 0 | Refills: 0 | DISCHARGE

## 2020-12-18 RX ORDER — ERGOCALCIFEROL 1.25 MG/1
1 CAPSULE ORAL
Qty: 0 | Refills: 0 | DISCHARGE

## 2020-12-18 RX ORDER — METOPROLOL TARTRATE 50 MG
1 TABLET ORAL
Qty: 0 | Refills: 0 | DISCHARGE

## 2020-12-18 RX ORDER — DILTIAZEM HCL 120 MG
1 CAPSULE, EXT RELEASE 24 HR ORAL
Qty: 60 | Refills: 2
Start: 2020-12-18 | End: 2021-03-17

## 2020-12-18 RX ORDER — SODIUM CHLORIDE 9 MG/ML
3 INJECTION INTRAMUSCULAR; INTRAVENOUS; SUBCUTANEOUS EVERY 8 HOURS
Refills: 0 | Status: DISCONTINUED | OUTPATIENT
Start: 2020-12-18 | End: 2021-01-01

## 2020-12-18 RX ORDER — RAMIPRIL 5 MG
1 CAPSULE ORAL
Qty: 0 | Refills: 0 | DISCHARGE

## 2020-12-18 RX ORDER — APREMILAST 10-20-30MG
1 KIT ORAL
Qty: 0 | Refills: 0 | DISCHARGE

## 2020-12-18 RX ORDER — NORTRIPTYLINE HYDROCHLORIDE 10 MG/1
2 CAPSULE ORAL
Qty: 0 | Refills: 0 | DISCHARGE

## 2020-12-18 RX ORDER — OMEPRAZOLE 10 MG/1
1 CAPSULE, DELAYED RELEASE ORAL
Qty: 0 | Refills: 0 | DISCHARGE

## 2020-12-18 RX ORDER — ATORVASTATIN CALCIUM 80 MG/1
1 TABLET, FILM COATED ORAL
Qty: 0 | Refills: 0 | DISCHARGE

## 2020-12-18 RX ORDER — SITAGLIPTIN AND METFORMIN HYDROCHLORIDE 500; 50 MG/1; MG/1
1 TABLET, FILM COATED ORAL
Qty: 0 | Refills: 0 | DISCHARGE

## 2020-12-18 NOTE — CHART NOTE - NSCHARTNOTEFT_GEN_A_CORE
Type of Procedure: EPS +/- PVC ablation  Licensed independent practitioner: Richard Fay MD  Assistant: None  Description of procedure:   Informed consent was obtained and the patient was brought to the lab in the fasting state. Dobutamine and atropine were tried to induce the PVCs but no PVCs were seen. No access was obtained. The patient was awake with no PVCs.  Findings of procedure: As above  Estimated blood loss: 0cc  Specimen removed: N/A  Preoperative Dx: PVC  Postoperative Dx: PVC  Complications: None  Anesthesia type: Conscious

## 2020-12-18 NOTE — CHART NOTE - NSCHARTNOTEFT_GEN_A_CORE
48 y/o woman with Hx of HTN, HLD, DMII, psoriatic arthritis, Hx of migraines, cervical spondylosis and persistent palpitations with noted frequent PVCs on EKG who presents today for PVC ablation. Began feeling palpitations about 6 months ago. Saw a Cardiologist at that time and was told she has extra beats but it was normal, had cardiac MRI which was normal. Symptoms began to go away and then came back. Feels palpitations all day long which sometimes radiates to her neck, often felt during sleep. Lying on her left side can exacerbate symptoms. Was started on metoprolol, titrating up but with no change in symptoms. Does have some dyspnea on exertion when she feels palpitations. Given persistent symptoms despite use of beta blockers ablation was recommended.    PST H&P and labs reviewed  Pt is COVID negative as of 12/15/2020  Pt has no new complaints at this time

## 2021-01-28 ENCOUNTER — APPOINTMENT (OUTPATIENT)
Dept: ELECTROPHYSIOLOGY | Facility: CLINIC | Age: 51
End: 2021-01-28

## 2021-03-22 ENCOUNTER — APPOINTMENT (OUTPATIENT)
Dept: ELECTROPHYSIOLOGY | Facility: CLINIC | Age: 51
End: 2021-03-22
Payer: COMMERCIAL

## 2021-03-22 VITALS
SYSTOLIC BLOOD PRESSURE: 126 MMHG | HEIGHT: 59 IN | HEART RATE: 93 BPM | DIASTOLIC BLOOD PRESSURE: 78 MMHG | OXYGEN SATURATION: 99 % | TEMPERATURE: 97 F | BODY MASS INDEX: 21.01 KG/M2

## 2021-03-22 VITALS — WEIGHT: 104 LBS | BODY MASS INDEX: 21.01 KG/M2

## 2021-03-22 DIAGNOSIS — K21.9 GASTRO-ESOPHAGEAL REFLUX DISEASE W/OUT ESOPHAGITIS: ICD-10-CM

## 2021-03-22 PROCEDURE — 93000 ELECTROCARDIOGRAM COMPLETE: CPT

## 2021-03-22 PROCEDURE — 99072 ADDL SUPL MATRL&STAF TM PHE: CPT

## 2021-03-22 PROCEDURE — 99215 OFFICE O/P EST HI 40 MIN: CPT

## 2021-03-22 RX ORDER — DILTIAZEM HYDROCHLORIDE 90 MG/1
90 TABLET ORAL TWICE DAILY
Refills: 0 | Status: DISCONTINUED | COMMUNITY
Start: 2021-02-10 | End: 2021-03-22

## 2021-03-22 RX ORDER — NORTRIPTYLINE HYDROCHLORIDE 10 MG/1
10 CAPSULE ORAL
Qty: 60 | Refills: 0 | Status: ACTIVE | COMMUNITY
Start: 2020-11-11

## 2021-03-22 RX ORDER — UBROGEPANT 50 MG/1
50 TABLET ORAL
Qty: 10 | Refills: 0 | Status: DISCONTINUED | COMMUNITY
Start: 2020-12-10

## 2021-03-22 RX ORDER — MEXILETINE HYDROCHLORIDE 150 MG/1
150 CAPSULE ORAL
Qty: 180 | Refills: 3 | Status: DISCONTINUED | COMMUNITY
Start: 2020-11-16 | End: 2021-03-22

## 2021-03-22 RX ORDER — ATORVASTATIN CALCIUM 10 MG/1
10 TABLET, FILM COATED ORAL
Qty: 90 | Refills: 3 | Status: ACTIVE | COMMUNITY
Start: 2021-03-22

## 2021-03-22 RX ORDER — RIMEGEPANT SULFATE 75 MG/75MG
75 TABLET, ORALLY DISINTEGRATING ORAL
Qty: 8 | Refills: 0 | Status: DISCONTINUED | COMMUNITY
Start: 2021-02-15

## 2021-03-22 RX ORDER — FLUTICASONE PROPIONATE 50 UG/1
50 SPRAY, METERED NASAL
Qty: 16 | Refills: 0 | Status: ACTIVE | COMMUNITY
Start: 2020-11-05

## 2021-03-22 RX ORDER — METHYLPREDNISOLONE 4 MG/1
4 TABLET ORAL
Qty: 21 | Refills: 0 | Status: DISCONTINUED | COMMUNITY
Start: 2020-11-13

## 2021-03-22 RX ORDER — NAPROXEN 500 MG/1
500 TABLET ORAL
Qty: 60 | Refills: 0 | Status: DISCONTINUED | COMMUNITY
Start: 2020-11-11

## 2021-03-22 NOTE — PHYSICAL EXAM
[General Appearance - Well Developed] : well developed [Normal Appearance] : normal appearance [Well Groomed] : well groomed [General Appearance - Well Nourished] : well nourished [No Deformities] : no deformities [General Appearance - In No Acute Distress] : no acute distress [Normal Conjunctiva] : the conjunctiva exhibited no abnormalities [Eyelids - No Xanthelasma] : the eyelids demonstrated no xanthelasmas [Normal Oral Mucosa] : normal oral mucosa [No Oral Pallor] : no oral pallor [No Oral Cyanosis] : no oral cyanosis [Normal Jugular Venous A Waves Present] : normal jugular venous A waves present [Normal Jugular Venous V Waves Present] : normal jugular venous V waves present [No Jugular Venous Balderrama A Waves] : no jugular venous balderrama A waves [Respiration, Rhythm And Depth] : normal respiratory rhythm and effort [Exaggerated Use Of Accessory Muscles For Inspiration] : no accessory muscle use [Auscultation Breath Sounds / Voice Sounds] : lungs were clear to auscultation bilaterally [Heart Rate And Rhythm] : heart rate and rhythm were normal [Heart Sounds] : normal S1 and S2 [Murmurs] : no murmurs present [Abdomen Soft] : soft [Abdomen Tenderness] : non-tender [Abdomen Mass (___ Cm)] : no abdominal mass palpated [Abnormal Walk] : normal gait [Gait - Sufficient For Exercise Testing] : the gait was sufficient for exercise testing [Nail Clubbing] : no clubbing of the fingernails [Cyanosis, Localized] : no localized cyanosis [Petechial Hemorrhages (___cm)] : no petechial hemorrhages [Skin Color & Pigmentation] : normal skin color and pigmentation [] : no rash [No Venous Stasis] : no venous stasis [Skin Lesions] : no skin lesions [No Skin Ulcers] : no skin ulcer [No Xanthoma] : no  xanthoma was observed [Oriented To Time, Place, And Person] : oriented to person, place, and time [Affect] : the affect was normal [Mood] : the mood was normal [No Anxiety] : not feeling anxious

## 2021-03-22 NOTE — DISCUSSION/SUMMARY
[FreeTextEntry1] : Anh Rodriguez is a 51y/o woman with Hx of HTN, HLD, both of which are stable, DMII, psoriatic arthritis, Hx of migraines, cervical spondylosis and persistent palpitations with noted frequent PVCs on EKG, previously on mexiletine, who presents today for routine f/u. \par \par Impression:\par \par 1. PVCs: prior EKG with frequent PVCs, likely fascicular. Attempt at ablation but no performed given no PVCs induced during case. EKG performed today to assess for recurrent PVCs and reveals NSR. On diltiazem 90 BID. Recommend changing to extended release, diltiazem 180mg daily. Rx provided. \par \par 2. HTN: resume oral antihypertensives as prescribed. Encouraged heart healthy diet, sodium restriction, and weight loss. Continue regular f/u with Cardiologist for further HTN management.\par \par 3. HLD: resume statin therapy as prescribed and regular f/u with Cardiologist for routine lipid monitoring and management.\par \par Will continue f/u with Cardiologist and may RTO as needed or if any new or worsening symptoms or findings occur.

## 2021-03-22 NOTE — HISTORY OF PRESENT ILLNESS
[FreeTextEntry1] : Cyndy Terrell MD\par Dr. Phylicia Currie\par \par Anh Rodriguez is a 51y/o woman with Hx of HTN, HLD, both of which are stable, DMII, psoriatic arthritis, Hx of migraines, cervical spondylosis and persistent palpitations with noted frequent PVCs on EKG, previously on mexiletine, who presents today for routine f/u. On initial visit, she had began feeling palpitations about 6 months ago. Saw a Cardiologist at that time and was told she has extra betas but it was normal, had cardiac MRI which was normal. Symptoms began to go away and then came back. Feels palpitations all day long which sometimes radiates to her neck, often felt during sleep. Lying on her left side can exacerbate symptoms. Was started on metoprolol, titrating up but with no change in symptoms. Underwent attempt at ablation but no PVCs were found and ablation not performed. Now on diltiazem 90mg BID. Still feeling palpitations, some relief with diltiazem use. Denies chest pain, SOB, syncope or near syncope.\par \par

## 2021-03-25 ENCOUNTER — NON-APPOINTMENT (OUTPATIENT)
Age: 51
End: 2021-03-25

## 2022-05-03 ENCOUNTER — APPOINTMENT (OUTPATIENT)
Dept: CARDIOLOGY | Facility: CLINIC | Age: 52
End: 2022-05-03
Payer: COMMERCIAL

## 2022-05-03 VITALS
OXYGEN SATURATION: 99 % | TEMPERATURE: 98.1 F | BODY MASS INDEX: 22.18 KG/M2 | SYSTOLIC BLOOD PRESSURE: 110 MMHG | HEIGHT: 59 IN | HEART RATE: 87 BPM | WEIGHT: 110 LBS | DIASTOLIC BLOOD PRESSURE: 72 MMHG

## 2022-05-03 PROCEDURE — 99214 OFFICE O/P EST MOD 30 MIN: CPT

## 2022-05-03 RX ORDER — RIMEGEPANT SULFATE 75 MG/75MG
75 TABLET, ORALLY DISINTEGRATING ORAL
Refills: 0 | Status: ACTIVE | COMMUNITY
Start: 2022-05-03

## 2022-05-03 NOTE — REVIEW OF SYSTEMS
[SOB] : no shortness of breath [Dyspnea on exertion] : not dyspnea during exertion [Chest Discomfort] : no chest discomfort [Lower Ext Edema] : no extremity edema [Leg Claudication] : no intermittent leg claudication [Palpitations] : palpitations [Orthopnea] : no orthopnea [PND] : no PND [Syncope] : no syncope [Negative] : Heme/Lymph

## 2022-05-03 NOTE — HISTORY OF PRESENT ILLNESS
[FreeTextEntry1] : PMD: Dr. Andres Arguello\par \par 51F HTN, HLD, DM2, psoriatic arthritis, hx of migraines, cervical spondylosis, PVC's\par \par Prior evaluation included a negative cMRI and stress test.\par A holter revealed interpolated PVC's which were thought to be fascicular in nature. \par Trial of metoprolol still with sx. She went for EPS and could not locate the PVC.\par Transitioned from a trial of metoprolol to diltiazem 180mg. she reports her symptoms initially improved but more recently her palpitations have worsened. still with sensation in neck/throat. no chest pain or new OLIVAS.\par \par has 2 children\par works as \par \par ECG 10/26/2020:\par SR with interpolated PVC's (pSVT was entered in error) \par

## 2022-05-03 NOTE — DISCUSSION/SUMMARY
[FreeTextEntry1] : Recurrence of palpitations\par known interpolated PVC's\par \par will increase diltiazem to 240mg daily. discussed possible side effects and consideration of addition of a low dose betablocker.\par otherwise will discuss with EP re: possible repeat EPS vs. antiarrhythmic therapy.

## 2022-05-05 ENCOUNTER — TRANSCRIPTION ENCOUNTER (OUTPATIENT)
Age: 52
End: 2022-05-05

## 2022-05-06 ENCOUNTER — TRANSCRIPTION ENCOUNTER (OUTPATIENT)
Age: 52
End: 2022-05-06

## 2022-07-19 ENCOUNTER — RX RENEWAL (OUTPATIENT)
Age: 52
End: 2022-07-19

## 2022-08-12 NOTE — ED ADULT NURSE NOTE - NS ED PATIENT SAFETY CONCERN
No Details (Free Text): Face Detail Level: Zone Photo Preface (Leave Blank If You Do Not Want): Photographs were obtained today

## 2022-08-18 ENCOUNTER — APPOINTMENT (OUTPATIENT)
Dept: ELECTROPHYSIOLOGY | Facility: CLINIC | Age: 52
End: 2022-08-18

## 2023-01-13 ENCOUNTER — RX RENEWAL (OUTPATIENT)
Age: 53
End: 2023-01-13

## 2023-01-25 ENCOUNTER — TRANSCRIPTION ENCOUNTER (OUTPATIENT)
Age: 53
End: 2023-01-25

## 2023-01-26 ENCOUNTER — APPOINTMENT (OUTPATIENT)
Dept: GASTROENTEROLOGY | Facility: CLINIC | Age: 53
End: 2023-01-26
Payer: COMMERCIAL

## 2023-01-26 ENCOUNTER — TRANSCRIPTION ENCOUNTER (OUTPATIENT)
Age: 53
End: 2023-01-26

## 2023-01-26 ENCOUNTER — NON-APPOINTMENT (OUTPATIENT)
Age: 53
End: 2023-01-26

## 2023-01-26 DIAGNOSIS — R14.0 ABDOMINAL DISTENSION (GASEOUS): ICD-10-CM

## 2023-01-26 PROCEDURE — G2012 BRIEF CHECK IN BY MD/QHP: CPT

## 2023-01-26 NOTE — HISTORY OF PRESENT ILLNESS
[FreeTextEntry1] : Patient and I had a telephone conversation today but I asked her to come to the office instead I felt that her condition required face-to-face visitation.  Patient will schedule at her convenience.  She is in no acute distress.

## 2023-01-31 ENCOUNTER — APPOINTMENT (OUTPATIENT)
Dept: GASTROENTEROLOGY | Facility: CLINIC | Age: 53
End: 2023-01-31
Payer: COMMERCIAL

## 2023-01-31 VITALS
SYSTOLIC BLOOD PRESSURE: 110 MMHG | WEIGHT: 106 LBS | BODY MASS INDEX: 21.37 KG/M2 | HEIGHT: 59 IN | OXYGEN SATURATION: 98 % | TEMPERATURE: 97.7 F | HEART RATE: 109 BPM | DIASTOLIC BLOOD PRESSURE: 80 MMHG

## 2023-01-31 DIAGNOSIS — R10.11 RIGHT UPPER QUADRANT PAIN: ICD-10-CM

## 2023-01-31 DIAGNOSIS — Z00.00 ENCOUNTER FOR GENERAL ADULT MEDICAL EXAMINATION W/OUT ABNORMAL FINDINGS: ICD-10-CM

## 2023-01-31 PROCEDURE — 99214 OFFICE O/P EST MOD 30 MIN: CPT

## 2023-01-31 RX ORDER — POLYETHYLENE GLYCOL-3350 AND ELECTROLYTES 236; 6.74; 5.86; 2.97; 22.74 G/274.31G; G/274.31G; G/274.31G; G/274.31G; G/274.31G
236 POWDER, FOR SOLUTION ORAL
Qty: 1 | Refills: 0 | Status: ACTIVE | COMMUNITY
Start: 2023-01-31 | End: 1900-01-01

## 2023-01-31 NOTE — ASSESSMENT
[FreeTextEntry1] : Impression and plan\par Patient has been experiencing some right upper quadrant abdominal discomfort.  I will recommend further testing in the form of upper endoscopy as well as screening colonoscopy.  Patient will be sent for MRI scan to rule out unsuspected pathology and to follow-up on history of small pancreatic cyst fatty liver and presumed benign liver lesion.  The risks benefits alternatives limitations were discussed.  Patient will call back after MRI is complete and schedule colonoscopy endoscopy at her convenience.

## 2023-01-31 NOTE — HISTORY OF PRESENT ILLNESS
[FreeTextEntry1] : Patient came to the office today with complaint of intermittent right upper quadrant abdominal discomfort.  She is currently asymptomatic but notes intermittent sharp pain just below the right costal margin.  There is no associated nausea vomiting fever chills rectal bleeding or melena.  Patient had recent abdominal sonogram ordered by primary care physician.  We reviewed that together demonstrating fatty infiltration as well as a stable lesion in the liver.

## 2023-02-02 DIAGNOSIS — Z01.812 ENCOUNTER FOR PREPROCEDURAL LABORATORY EXAMINATION: ICD-10-CM

## 2023-02-02 DIAGNOSIS — Z20.822 ENCOUNTER FOR PREPROCEDURAL LABORATORY EXAMINATION: ICD-10-CM

## 2023-02-08 ENCOUNTER — APPOINTMENT (OUTPATIENT)
Dept: MRI IMAGING | Facility: CLINIC | Age: 53
End: 2023-02-08
Payer: COMMERCIAL

## 2023-02-08 PROCEDURE — A9585: CPT | Mod: JW

## 2023-02-08 PROCEDURE — 74183 MRI ABD W/O CNTR FLWD CNTR: CPT

## 2023-02-09 ENCOUNTER — TRANSCRIPTION ENCOUNTER (OUTPATIENT)
Age: 53
End: 2023-02-09

## 2023-02-10 ENCOUNTER — TRANSCRIPTION ENCOUNTER (OUTPATIENT)
Age: 53
End: 2023-02-10

## 2023-03-21 ENCOUNTER — APPOINTMENT (OUTPATIENT)
Dept: GASTROENTEROLOGY | Facility: AMBULATORY MEDICAL SERVICES | Age: 53
End: 2023-03-21
Payer: COMMERCIAL

## 2023-03-21 ENCOUNTER — APPOINTMENT (OUTPATIENT)
Dept: GASTROENTEROLOGY | Facility: AMBULATORY MEDICAL SERVICES | Age: 53
End: 2023-03-21

## 2023-03-21 PROCEDURE — 43235 EGD DIAGNOSTIC BRUSH WASH: CPT | Mod: 59

## 2023-03-21 PROCEDURE — 45380 COLONOSCOPY AND BIOPSY: CPT | Mod: 33

## 2023-03-29 ENCOUNTER — APPOINTMENT (OUTPATIENT)
Dept: GASTROENTEROLOGY | Facility: CLINIC | Age: 53
End: 2023-03-29
Payer: COMMERCIAL

## 2023-03-29 VITALS
BODY MASS INDEX: 20.36 KG/M2 | TEMPERATURE: 97.1 F | SYSTOLIC BLOOD PRESSURE: 100 MMHG | OXYGEN SATURATION: 99 % | HEIGHT: 59 IN | WEIGHT: 101 LBS | HEART RATE: 101 BPM | DIASTOLIC BLOOD PRESSURE: 60 MMHG

## 2023-03-29 PROCEDURE — 99213 OFFICE O/P EST LOW 20 MIN: CPT

## 2023-03-29 NOTE — HISTORY OF PRESENT ILLNESS
[FreeTextEntry1] : Patient came to the office today for follow-up and discussion.  We reviewed recent testing.  She has occasional dyspepsia we discussed the use of H2 blocker versus PPI.  Overall patient is doing okay.  She denies current nausea vomiting fever chills rectal bleeding or melena.

## 2023-05-02 ENCOUNTER — APPOINTMENT (OUTPATIENT)
Dept: CARDIOLOGY | Facility: CLINIC | Age: 53
End: 2023-05-02
Payer: COMMERCIAL

## 2023-05-02 ENCOUNTER — NON-APPOINTMENT (OUTPATIENT)
Age: 53
End: 2023-05-02

## 2023-05-02 VITALS
WEIGHT: 103 LBS | OXYGEN SATURATION: 98 % | RESPIRATION RATE: 20 BRPM | TEMPERATURE: 97.5 F | HEIGHT: 59 IN | HEART RATE: 102 BPM | DIASTOLIC BLOOD PRESSURE: 58 MMHG | BODY MASS INDEX: 20.76 KG/M2 | SYSTOLIC BLOOD PRESSURE: 112 MMHG

## 2023-05-02 DIAGNOSIS — R00.2 PALPITATIONS: ICD-10-CM

## 2023-05-02 DIAGNOSIS — R07.89 OTHER CHEST PAIN: ICD-10-CM

## 2023-05-02 DIAGNOSIS — I49.3 VENTRICULAR PREMATURE DEPOLARIZATION: ICD-10-CM

## 2023-05-02 PROCEDURE — 99214 OFFICE O/P EST MOD 30 MIN: CPT | Mod: 25

## 2023-05-02 PROCEDURE — 93000 ELECTROCARDIOGRAM COMPLETE: CPT

## 2023-05-02 RX ORDER — SECUKINUMAB 150 MG/ML
INJECTION SUBCUTANEOUS
Refills: 0 | Status: ACTIVE | COMMUNITY
Start: 2023-05-02

## 2023-05-02 RX ORDER — APREMILAST 10-20-30MG
KIT ORAL
Refills: 0 | Status: DISCONTINUED | COMMUNITY
Start: 2020-10-26 | End: 2023-05-02

## 2023-05-02 RX ORDER — DILTIAZEM HYDROCHLORIDE 240 MG/1
240 CAPSULE, EXTENDED RELEASE ORAL DAILY
Qty: 30 | Refills: 5 | Status: DISCONTINUED | COMMUNITY
Start: 2021-03-22 | End: 2023-05-02

## 2023-05-02 NOTE — HISTORY OF PRESENT ILLNESS
[FreeTextEntry1] : PMD: Dr. Andres Arguello\par \par 52F HTN, HLD, DM2, psoriatic arthritis, hx of migraines, cervical spondylosis, PVC's\par \par Prior evaluation included a negative cMRI and stress test.\par A holter revealed interpolated PVC's which were thought to be fascicular in nature. \par Trial of metoprolol still with sx. She went for EPS and could not locate the PVC.\par Transitioned from a trial of metoprolol to diltiazem 180mg. she reports her symptoms initially improved but more recently her palpitations have worsened. still with sensation in neck/throat. no chest pain or new OLIVAS.\par \par Palpitations have been controlled.\par She experienced recently an episode of L-sided chest/arm pain that radiated down her arm that woke her up and prevented her from going back to sleep. her rheumatoid arthritis medications were changed since last visit due to her symptoms increasing.\par No other associated sx. had a very mild recurrence \par \par \par \par \par has 2 children\par works as \par \par \par \par \par ECG 10/26/2020:\par SR with interpolated PVC's (pSVT was entered in error) \par

## 2023-05-02 NOTE — DISCUSSION/SUMMARY
[FreeTextEntry1] : atypical chest sx with multiple cardiac risk factors\par \par will check a treadmill stress test to r/o cardiac ischemia\par cont diltiazem 180mg as this appears to be working. should sx recur will have pt seen by EP for eval for ablation [EKG obtained to assist in diagnosis and management of assessed problem(s)] : EKG obtained to assist in diagnosis and management of assessed problem(s)

## 2023-05-04 ENCOUNTER — TRANSCRIPTION ENCOUNTER (OUTPATIENT)
Age: 53
End: 2023-05-04

## 2023-05-08 ENCOUNTER — APPOINTMENT (OUTPATIENT)
Dept: CARDIOLOGY | Facility: CLINIC | Age: 53
End: 2023-05-08
Payer: COMMERCIAL

## 2023-05-08 PROCEDURE — 93015 CV STRESS TEST SUPVJ I&R: CPT

## 2023-05-08 PROCEDURE — ZZZZZ: CPT

## 2023-05-30 ENCOUNTER — NON-APPOINTMENT (OUTPATIENT)
Age: 53
End: 2023-05-30

## 2023-07-11 ENCOUNTER — APPOINTMENT (OUTPATIENT)
Dept: GASTROENTEROLOGY | Facility: CLINIC | Age: 53
End: 2023-07-11
Payer: COMMERCIAL

## 2023-07-11 VITALS
BODY MASS INDEX: 20.36 KG/M2 | SYSTOLIC BLOOD PRESSURE: 110 MMHG | HEART RATE: 111 BPM | WEIGHT: 101 LBS | TEMPERATURE: 97.1 F | DIASTOLIC BLOOD PRESSURE: 70 MMHG | HEIGHT: 59 IN | OXYGEN SATURATION: 99 %

## 2023-07-11 DIAGNOSIS — R10.13 EPIGASTRIC PAIN: ICD-10-CM

## 2023-07-11 PROCEDURE — 99214 OFFICE O/P EST MOD 30 MIN: CPT

## 2023-07-11 NOTE — ASSESSMENT
[FreeTextEntry1] : Impression and plan\par Patient came to the office for follow-up and discussion.  Overall she is doing well.  Recent repeat blood work was within satisfactory range with regard to CBC and liver function testing.  Patient's MRI scan will be repeated in 2 years.  She will continue on H2 blocker for intermittent dyspeptic complaints and taper as tolerated.  Keep me updated regarding progress and I will advise.

## 2023-07-11 NOTE — HISTORY OF PRESENT ILLNESS
[FreeTextEntry1] : Patient returns for follow-up and discussion.  Overall she is feeling well.  She is taking H2 blocker twice daily dosing with good relief.  Patient has had recent blood work performed about 6 weeks ago which was remarkable for mild hyperlipidemia and elevated blood glucose but normal liver function studies including alkaline phosphatase.  Patient has had MRI MRCP demonstrating small pancreatic cyst which will require a 2-year surveillance scanning.  Upper endoscopy and colonoscopy results were reviewed.

## 2023-07-31 ENCOUNTER — RX RENEWAL (OUTPATIENT)
Age: 53
End: 2023-07-31

## 2023-08-13 ENCOUNTER — TRANSCRIPTION ENCOUNTER (OUTPATIENT)
Age: 53
End: 2023-08-13

## 2023-08-15 LAB — GI PCR PANEL: NOT DETECTED

## 2023-08-17 LAB — H PYLORI AG STL QL: NEGATIVE

## 2023-08-29 ENCOUNTER — APPOINTMENT (OUTPATIENT)
Dept: GASTROENTEROLOGY | Facility: CLINIC | Age: 53
End: 2023-08-29

## 2024-01-17 ENCOUNTER — APPOINTMENT (OUTPATIENT)
Dept: CARDIOLOGY | Facility: CLINIC | Age: 54
End: 2024-01-17
Payer: COMMERCIAL

## 2024-01-17 ENCOUNTER — NON-APPOINTMENT (OUTPATIENT)
Age: 54
End: 2024-01-17

## 2024-01-17 VITALS
SYSTOLIC BLOOD PRESSURE: 101 MMHG | DIASTOLIC BLOOD PRESSURE: 59 MMHG | TEMPERATURE: 97.3 F | RESPIRATION RATE: 18 BRPM | BODY MASS INDEX: 21.97 KG/M2 | HEART RATE: 90 BPM | HEIGHT: 59 IN | OXYGEN SATURATION: 99 % | WEIGHT: 109 LBS

## 2024-01-17 DIAGNOSIS — R00.2 PALPITATIONS: ICD-10-CM

## 2024-01-17 DIAGNOSIS — I10 ESSENTIAL (PRIMARY) HYPERTENSION: ICD-10-CM

## 2024-01-17 DIAGNOSIS — R20.0 ANESTHESIA OF SKIN: ICD-10-CM

## 2024-01-17 DIAGNOSIS — E78.5 HYPERLIPIDEMIA, UNSPECIFIED: ICD-10-CM

## 2024-01-17 DIAGNOSIS — I49.3 VENTRICULAR PREMATURE DEPOLARIZATION: ICD-10-CM

## 2024-01-17 PROCEDURE — 99214 OFFICE O/P EST MOD 30 MIN: CPT | Mod: 25

## 2024-01-17 PROCEDURE — 93000 ELECTROCARDIOGRAM COMPLETE: CPT

## 2024-01-17 RX ORDER — DILTIAZEM HYDROCHLORIDE 180 MG/1
180 CAPSULE, EXTENDED RELEASE ORAL
Qty: 90 | Refills: 3 | Status: ACTIVE | COMMUNITY
Start: 2022-07-19 | End: 1900-01-01

## 2024-01-17 NOTE — PHYSICAL EXAM
[Soft] : abdomen soft [Non Tender] : non-tender [Normal] : moves all extremities, no focal deficits, normal speech

## 2024-01-18 PROBLEM — R00.2 INTERMITTENT PALPITATIONS: Status: ACTIVE | Noted: 2020-10-26

## 2024-01-18 PROBLEM — I49.3 FREQUENT PVCS: Status: ACTIVE | Noted: 2020-11-16

## 2024-01-18 PROBLEM — I10 BENIGN ESSENTIAL HTN: Status: ACTIVE | Noted: 2020-11-16

## 2024-01-18 PROBLEM — E78.5 HLD (HYPERLIPIDEMIA): Status: ACTIVE | Noted: 2021-03-22

## 2024-01-18 NOTE — DISCUSSION/SUMMARY
[FreeTextEntry1] : ?occult CVA. no acute or evolving symptoms. check MRI brain if there is evidence of infarct will then need to evaluate for occult arrhythmia that could be part of her known PVC;s  cont diltiazem 180mg as this appears to be working. should sx recur will have pt seen by EP for eval for ablation [EKG obtained to assist in diagnosis and management of assessed problem(s)] : EKG obtained to assist in diagnosis and management of assessed problem(s)

## 2024-01-18 NOTE — REVIEW OF SYSTEMS
[Chest Discomfort] : chest discomfort [Negative] : Heme/Lymph [Numbness (Hypoesthesia)] : numbness [Tingling (Paresthesia)] : tingling [SOB] : no shortness of breath [Dyspnea on exertion] : not dyspnea during exertion [Lower Ext Edema] : no extremity edema [Leg Claudication] : no intermittent leg claudication [Palpitations] : palpitations [Orthopnea] : no orthopnea [PND] : no PND [Syncope] : no syncope

## 2024-01-18 NOTE — HISTORY OF PRESENT ILLNESS
[FreeTextEntry1] : PMD: Dr. Andres Arguello  53F HTN, HLD, DM2, psoriatic arthritis, hx of migraines, cervical spondylosis, PVC's  Prior evaluation included a negative cMRI and stress test. A holter revealed interpolated PVC's which were thought to be fascicular in nature.  Trial of metoprolol still with sx. She went for EPS and could not locate the PVC. Transitioned from a trial of metoprolol to diltiazem 180mg. she reports her symptoms initially improved but more recently her palpitations have worsened. still with sensation in neck/throat. no chest pain or new OLIVAS.  Palpitations have been controlled.  currently well has some bilateral outer ear numbness, intermittent for the past few months no other associated sx has earlobe warmth but not always associated had with wine but occurs without   has 2 children works as    ECG 10/26/2020: SR with interpolated PVC's (pSVT was entered in error)

## 2024-01-18 NOTE — CARDIOLOGY SUMMARY
[de-identified] : 1/17/24: Sayra ESPITIA 5/2/23: Sayra ESPITIA [de-identified] : 5/8/23: Conclusions 1. Baseline ECG: sinus rhythm at a rate of 95 bpm with no arrhythmias. 2. The patient underwent stress testing using the standard [facility] protocol. _ The patient exercised for 9 min 0 sec. _ The test was stopped due to target heart rate achieved. _ The peak heart rate was 149 bpm; 88 % of predicted maximal heart rate for this patient. _ The patient achieved 10.3 METS which is consistent with good exercise capacity. 3. The Duke Treadmill Score is 9.00, which is consistent with low risk (=5) for future cardiac events. 4. Blood pressure response: normal. 5. The heart rate response was blunted. 6. The ECG is negative for ischemia.

## 2024-01-23 ENCOUNTER — TRANSCRIPTION ENCOUNTER (OUTPATIENT)
Age: 54
End: 2024-01-23

## 2024-01-24 ENCOUNTER — OUTPATIENT (OUTPATIENT)
Dept: OUTPATIENT SERVICES | Facility: HOSPITAL | Age: 54
LOS: 1 days | End: 2024-01-24
Payer: COMMERCIAL

## 2024-01-24 ENCOUNTER — APPOINTMENT (OUTPATIENT)
Dept: MRI IMAGING | Facility: CLINIC | Age: 54
End: 2024-01-24
Payer: COMMERCIAL

## 2024-01-24 DIAGNOSIS — R20.0 ANESTHESIA OF SKIN: ICD-10-CM

## 2024-01-24 DIAGNOSIS — Z90.721 ACQUIRED ABSENCE OF OVARIES, UNILATERAL: Chronic | ICD-10-CM

## 2024-01-24 DIAGNOSIS — Z98.890 OTHER SPECIFIED POSTPROCEDURAL STATES: Chronic | ICD-10-CM

## 2024-01-24 PROCEDURE — 70551 MRI BRAIN STEM W/O DYE: CPT | Mod: 26

## 2024-01-24 PROCEDURE — 70551 MRI BRAIN STEM W/O DYE: CPT

## 2024-01-25 ENCOUNTER — MESSAGE (OUTPATIENT)
Age: 54
End: 2024-01-25

## 2024-01-29 ENCOUNTER — TRANSCRIPTION ENCOUNTER (OUTPATIENT)
Age: 54
End: 2024-01-29

## 2024-01-30 ENCOUNTER — TRANSCRIPTION ENCOUNTER (OUTPATIENT)
Age: 54
End: 2024-01-30

## 2024-03-11 ENCOUNTER — TRANSCRIPTION ENCOUNTER (OUTPATIENT)
Age: 54
End: 2024-03-11

## 2024-03-12 ENCOUNTER — APPOINTMENT (OUTPATIENT)
Dept: GASTROENTEROLOGY | Facility: CLINIC | Age: 54
End: 2024-03-12
Payer: COMMERCIAL

## 2024-03-12 VITALS
HEIGHT: 59 IN | BODY MASS INDEX: 22.18 KG/M2 | HEART RATE: 95 BPM | OXYGEN SATURATION: 98 % | WEIGHT: 110 LBS | TEMPERATURE: 97.1 F | SYSTOLIC BLOOD PRESSURE: 112 MMHG | DIASTOLIC BLOOD PRESSURE: 60 MMHG

## 2024-03-12 DIAGNOSIS — A09 INFECTIOUS GASTROENTERITIS AND COLITIS, UNSPECIFIED: ICD-10-CM

## 2024-03-12 PROCEDURE — 99213 OFFICE O/P EST LOW 20 MIN: CPT

## 2024-03-12 RX ORDER — GLIMEPIRIDE 4 MG/1
4 TABLET ORAL
Refills: 0 | Status: ACTIVE | COMMUNITY

## 2024-03-12 NOTE — ASSESSMENT
[FreeTextEntry1] : Impression and plan Patient came to the office today to arrange discuss recent diarrheal illness.  I suspect gastroenteritis of an infectious etiology.  Patient is feeling better at this time as such I will not treat.  Patient was counseled regarding diet and fluid.  Stool cups were given in case patient should have another attack.  Patient will keep in touch by phone.  She is up-to-date on colonoscopy.

## 2024-03-12 NOTE — HISTORY OF PRESENT ILLNESS
[FreeTextEntry1] : Patient came to the office today with recent acute diarrhea.  Patient had traveled to [place] and returned with crampy abdominal pain and explosive watery stools this persisted until yesterday.  Patient kept her appointment today.  She is currently asymptomatic with no nausea vomiting fever chills rectal bleeding melena and diarrhea has now stopped.  She started to eat.  She denies cardiac or pulmonary complaints.

## 2024-03-12 NOTE — PHYSICAL EXAM
[Alert] : alert [Normal Voice/Communication] : normal voice/communication [Sclera] : the sclera and conjunctiva were normal [No Acute Distress] : no acute distress [Healthy Appearing] : healthy appearing [Oropharynx] : the oropharynx was normal [Normal Lips/Gums] : the lips and gums were normal [Hearing Threshold Finger Rub Not Sully] : hearing was normal [Normal Appearance] : the appearance of the neck was normal [No Neck Mass] : no neck mass was observed [No Respiratory Distress] : no respiratory distress [No Acc Muscle Use] : no accessory muscle use [Respiration, Rhythm And Depth] : normal respiratory rhythm and effort [Auscultation Breath Sounds / Voice Sounds] : lungs were clear to auscultation bilaterally [Heart Rate And Rhythm] : heart rate was normal and rhythm regular [Normal S1, S2] : normal S1 and S2 [Murmurs] : no murmurs [Bowel Sounds] : normal bowel sounds [Abdomen Tenderness] : non-tender [Abdomen Soft] : soft [No Masses] : no abdominal mass palpated [] : no hepatosplenomegaly [Oriented To Time, Place, And Person] : oriented to person, place, and time

## 2024-03-19 ENCOUNTER — APPOINTMENT (OUTPATIENT)
Dept: GASTROENTEROLOGY | Facility: CLINIC | Age: 54
End: 2024-03-19

## 2024-09-19 ENCOUNTER — RX RENEWAL (OUTPATIENT)
Age: 54
End: 2024-09-19

## 2024-09-19 RX ORDER — DILTIAZEM HYDROCHLORIDE 180 MG/1
180 CAPSULE, EXTENDED RELEASE ORAL
Qty: 90 | Refills: 3 | Status: ACTIVE | COMMUNITY
Start: 2024-09-19 | End: 1900-01-01

## 2025-01-30 ENCOUNTER — NON-APPOINTMENT (OUTPATIENT)
Age: 55
End: 2025-01-30

## 2025-02-05 ENCOUNTER — APPOINTMENT (OUTPATIENT)
Dept: GASTROENTEROLOGY | Facility: CLINIC | Age: 55
End: 2025-02-05
Payer: COMMERCIAL

## 2025-02-05 VITALS
SYSTOLIC BLOOD PRESSURE: 102 MMHG | DIASTOLIC BLOOD PRESSURE: 68 MMHG | HEART RATE: 95 BPM | WEIGHT: 107 LBS | HEIGHT: 59 IN | TEMPERATURE: 97.5 F | BODY MASS INDEX: 21.57 KG/M2 | OXYGEN SATURATION: 99 %

## 2025-02-05 DIAGNOSIS — R14.0 ABDOMINAL DISTENSION (GASEOUS): ICD-10-CM

## 2025-02-05 PROCEDURE — 99213 OFFICE O/P EST LOW 20 MIN: CPT
